# Patient Record
Sex: MALE
[De-identification: names, ages, dates, MRNs, and addresses within clinical notes are randomized per-mention and may not be internally consistent; named-entity substitution may affect disease eponyms.]

---

## 2017-11-22 ENCOUNTER — HOSPITAL ENCOUNTER (EMERGENCY)
Dept: HOSPITAL 31 - C.ER | Age: 57
Discharge: HOME | End: 2017-11-22
Payer: SELF-PAY

## 2017-11-22 VITALS — DIASTOLIC BLOOD PRESSURE: 82 MMHG | TEMPERATURE: 98.3 F | SYSTOLIC BLOOD PRESSURE: 170 MMHG | HEART RATE: 80 BPM

## 2017-11-22 VITALS — RESPIRATION RATE: 18 BRPM | OXYGEN SATURATION: 99 %

## 2017-11-22 DIAGNOSIS — R42: Primary | ICD-10-CM

## 2017-11-22 LAB
COHGB MFR BLD: 1.6 % (ref 0.5–1.5)
METHGB MFR BLD: 0.8 % (ref 0–3)
SAO2 % BLDA: 96.7 % (ref 95–98)

## 2017-11-22 NOTE — C.PDOC
History Of Present Illness


56 y/o male presents to ED with c/o dizziness, nausea. Patient reports he works 

as a  and is concerned for possible carbon monoxide exposure. Notes 

he had been sleeping in the truck. Denies any other symptoms. 





Time Seen by Provider: 17 09:18


Chief Complaint (Nursing): Dizziness/Lightheaded


History Per: Patient


History/Exam Limitations: no limitations


Current Symptoms Are (Timing): Still Present


Seizure Or Post-ictal Symptoms: None


Fall Associated With With Symptoms: No


Recent travel outside of the [place]: No





Past Medical History


Reviewed: Historical Data, Nursing Documentation, Vital Signs


Vital Signs: 


 Last Vital Signs











Temp  97.6 F   17 09:16


 


Pulse  76   17 09:16


 


Resp  18   17 09:16


 


BP  176/85 H  17 09:16


 


Pulse Ox  99   17 09:46














- Medical History


PMH: No Chronic Diseases


Family History: States: Unknown Family Hx





- Social History


Hx Alcohol Use: No


Hx Substance Use: No





- Immunization History


Hx Tetanus Toxoid Vaccination: No


Hx Influenza Vaccination: No


Hx Pneumococcal Vaccination: No





Review Of Systems


Except As Marked, All Systems Reviewed And Found Negative.


Constitutional: Negative for: Fever, Chills


Cardiovascular: Negative for: Chest Pain


Respiratory: Negative for: Cough, Shortness of Breath


Gastrointestinal: Positive for: Nausea.  Negative for: Vomiting


Skin: Negative for: Rash


Neurological: Positive for: Dizziness





Physical Exam





- Physical Exam


Appears: Non-toxic, No Acute Distress


Skin: Normal Color, Warm, Dry


Head: Atraumatic, Normacephalic


Oral Mucosa: Moist


Chest: Symmetrical


Cardiovascular: Rhythm Regular


Respiratory: Normal Breath Sounds, No Rales, No Rhonchi, No Wheezing


Gastrointestinal/Abdominal: Soft, No Tenderness


Back: Normal Inspection


Extremity: Normal ROM


Neurological/Psych: Oriented x3





ED Course And Treatment


ECG: Interpreted By Me


ECG Rhythm: Sinus Rhythm


ECG Interpretation: Normal


Rate From EC


O2 Sat by Pulse Oximetry: 99 (RA)


Pulse Ox Interpretation: Normal


Progress Note: Zofran ordered. Carboxyhemoglobin and EKG ordered/reviewed.  On 

re-evaluation lungs clear in no distress


Reassessment Condition: Improved





Disposition


Counseled Patient/Family Regarding: Studies Performed, Diagnosis, Need For 

Followup





- Disposition


Referrals: 


[email or web address] [Outside]


[facility] [Outside]


Disposition: HOME/ ROUTINE


Disposition Time: 11:20


Condition: STABLE


Additional Instructions: 


Follow up with PMD for further evaluation


Instructions:  How Your Lungs Work (ED), Lightheadedness (ED), Dizziness (ED)


Forms:  [facility] (English)





- POA


Present On Arrival: None





- Clinical Impression


Clinical Impression: 


 Dizziness








- PA / NP / Resident Statement


MD/DO has reviewed & agrees with the documentation as recorded.





- Scribe Statement


The provider has reviewed the documentation as recorded by the Scribe





SM





All medical record entries made by the Scribe were at my direction and 

personally dictated by me. I have reviewed the chart and agree that the record 

accurately reflects my personal performance of the history, physical exam, 

medical decision making, and the department course for this patient. I have 

also personally directed, reviewed, and agree with the discharge instructions 

and disposition.

## 2018-07-07 ENCOUNTER — HOSPITAL ENCOUNTER (EMERGENCY)
Dept: HOSPITAL 31 - C.ER | Age: 58
Discharge: HOME | End: 2018-07-07
Payer: SELF-PAY

## 2018-07-07 VITALS
HEART RATE: 55 BPM | DIASTOLIC BLOOD PRESSURE: 86 MMHG | SYSTOLIC BLOOD PRESSURE: 137 MMHG | TEMPERATURE: 98.7 F | RESPIRATION RATE: 18 BRPM

## 2018-07-07 VITALS — OXYGEN SATURATION: 100 %

## 2018-07-07 DIAGNOSIS — I10: Primary | ICD-10-CM

## 2018-07-07 DIAGNOSIS — R42: ICD-10-CM

## 2018-07-07 LAB
ALBUMIN SERPL-MCNC: 4.5 G/DL (ref 3.5–5)
ALBUMIN/GLOB SERPL: 1.5 {RATIO} (ref 1–2.1)
ALT SERPL-CCNC: 30 U/L (ref 21–72)
ANISOCYTOSIS BLD QL SMEAR: SLIGHT
AST SERPL-CCNC: 21 U/L (ref 17–59)
BASOPHILS # BLD AUTO: 0.1 K/UL (ref 0–0.2)
BASOPHILS NFR BLD: 0.4 % (ref 0–2)
BILIRUB UR-MCNC: NEGATIVE MG/DL
BUN SERPL-MCNC: 12 MG/DL (ref 9–20)
CALCIUM SERPL-MCNC: 9.2 MG/DL (ref 8.6–10.4)
EOSINOPHIL # BLD AUTO: 0 K/UL (ref 0–0.7)
EOSINOPHIL NFR BLD: 0.1 % (ref 0–4)
ERYTHROCYTE [DISTWIDTH] IN BLOOD BY AUTOMATED COUNT: 14 % (ref 11.5–14.5)
GFR NON-AFRICAN AMERICAN: > 60
GLUCOSE UR STRIP-MCNC: NORMAL MG/DL
HGB BLD-MCNC: 16.4 G/DL (ref 12–18)
LEUKOCYTE ESTERASE UR-ACNC: (no result) LEU/UL
LG PLATELETS BLD QL SMEAR: PRESENT
LYMPHOCYTE: 4 % (ref 20–40)
LYMPHOCYTES # BLD AUTO: 1.2 K/UL (ref 1–4.3)
LYMPHOCYTES NFR BLD AUTO: 9.1 % (ref 20–40)
MCH RBC QN AUTO: 29.8 PG (ref 27–31)
MCHC RBC AUTO-ENTMCNC: 34.3 G/DL (ref 33–37)
MCV RBC AUTO: 86.9 FL (ref 80–94)
MONOCYTE: 3 % (ref 0–10)
MONOCYTES # BLD: 0.5 K/UL (ref 0–0.8)
MONOCYTES NFR BLD: 3.6 % (ref 0–10)
NEUTROPHILS # BLD: 11.1 K/UL (ref 1.8–7)
NEUTROPHILS NFR BLD AUTO: 86.8 % (ref 50–75)
NEUTROPHILS NFR BLD AUTO: 93 % (ref 50–75)
NRBC BLD AUTO-RTO: 0.1 % (ref 0–2)
OVALOCYTES BLD QL SMEAR: SLIGHT
PH UR STRIP: 5 [PH] (ref 5–8)
PLATELET # BLD EST: NORMAL 10*3/UL
PLATELET # BLD: 170 K/UL (ref 130–400)
PMV BLD AUTO: 11.5 FL (ref 7.2–11.7)
POIKILOCYTOSIS BLD QL SMEAR: SLIGHT
PROT UR STRIP-MCNC: NEGATIVE MG/DL
RBC # BLD AUTO: 5.5 MIL/UL (ref 4.4–5.9)
RBC # UR STRIP: NEGATIVE /UL
SP GR UR STRIP: 1.01 (ref 1–1.03)
TOTAL CELLS COUNTED BLD: 100
UROBILINOGEN UR-MCNC: NORMAL MG/DL (ref 0.2–1)
WBC # BLD AUTO: 12.8 K/UL (ref 4.8–10.8)

## 2018-07-07 PROCEDURE — 81001 URINALYSIS AUTO W/SCOPE: CPT

## 2018-07-07 PROCEDURE — 70450 CT HEAD/BRAIN W/O DYE: CPT

## 2018-07-07 PROCEDURE — 96374 THER/PROPH/DIAG INJ IV PUSH: CPT

## 2018-07-07 PROCEDURE — 85025 COMPLETE CBC W/AUTO DIFF WBC: CPT

## 2018-07-07 PROCEDURE — 80053 COMPREHEN METABOLIC PANEL: CPT

## 2018-07-07 PROCEDURE — 99285 EMERGENCY DEPT VISIT HI MDM: CPT

## 2018-07-07 NOTE — CT
PROCEDURE:  CT HEAD WITHOUT CONTRAST.



HISTORY:

Headache, Dizzy



COMPARISON:

None available. 



TECHNIQUE:

Axial computed tomography images were obtained through the head/brain 

without intravenous contrast.  



Radiation dose:



Total exam DLP = 867.24 mGy-cm.



This CT exam was performed using one or more of the following dose 

reduction techniques: Automated exposure control, adjustment of the 

mA and/or kV according to patient size, and/or use of iterative 

reconstruction technique.



FINDINGS:



HEMORRHAGE:

No acute parenchymal, subarachnoid or extra-axial hemorrhage. 



BRAIN:

No evidence of large acute infarct. No obvious parenchymal nor 

extra-axial mass or collection seen on this noncontrast study 



VENTRICLES:

Unremarkable. No hydrocephalus. 



CALVARIUM:

Unremarkable.



PARANASAL SINUSES:

Unremarkable as visualized. No significant inflammatory changes.



MASTOID AIR CELLS:

Unremarkable as visualized. No inflammatory changes.



OTHER FINDINGS:

None.



IMPRESSION:

No acute intracranial hemorrhage

## 2018-07-07 NOTE — C.PDOC
History Of Present Illness


58 year old male with a history of hypertension and vertigo complains of 

dizziness and headache for 3 days. He reports he took his blood pressure at 

home which was over 140/90. He also states he went to his PMD yesterday and the 

dose for his blood pressure medication was increased, but he has not started 

his new medication yet. He states he took Meclizine for vertigo with no relief. 

Patient denies chest pain, shortness of breath, vision changes, numbness, 

weakness, or tingling. 


Time Seen by Provider: 18 15:05


Chief Complaint (Nursing): Dizziness/Lightheaded


History Per: Patient


History/Exam Limitations: no limitations


Onset/Duration Of Symptoms: Days


Current Symptoms Are (Timing): Still Present


Associated Symptoms Preceding Syncopal Episode: Vertigo





Past Medical History


Reviewed: Historical Data, Nursing Documentation, Vital Signs


Vital Signs: 


 Last Vital Signs











Temp  98.7 F   18 16:40


 


Pulse  55 L  18 16:40


 


Resp  18   18 16:40


 


BP  137/86   18 16:40


 


Pulse Ox  100   18 16:54














- Medical History


PMH: HTN


Surgical History: No Surg Hx


Family History: States: No Known Family Hx





- Social History


Hx Alcohol Use: No


Hx Substance Use: No





- Immunization History


Hx Tetanus Toxoid Vaccination: No


Hx Influenza Vaccination: No


Hx Pneumococcal Vaccination: No





Review Of Systems


Eyes: Negative for: Vision Change


Cardiovascular: Negative for: Chest Pain


Respiratory: Negative for: Shortness of Breath


Neurological: Positive for: Headache, Dizziness.  Negative for: Weakness, 

Numbness





Physical Exam





- Physical Exam


Appears: Non-toxic, No Acute Distress


Skin: Normal Color, Warm, Dry


Head: Atraumatic, Normacephalic


Eye(s): bilateral: Normal Inspection, PERRL, EOMI, Other (no nystagmus)


Oral Mucosa: Moist


Neck: Normal ROM


Chest: Symmetrical


Cardiovascular: Rhythm Regular


Respiratory: Normal Breath Sounds, No Rales, No Rhonchi, No Wheezing


Extremity: Bilateral: Atraumatic, Normal Color And Temperature, Normal ROM


Neurological/Psych: Oriented x3, Normal Speech, Normal Cranial Nerves, No 

Cerebellar Signs, Normal Motor, Normal Sensation


Gait: Steady





ED Course And Treatment





- Laboratory Results


Result Diagrams: 


 18 15:48





 18 15:48


ECG: Interpreted By Me, Viewed By Me


ECG Rhythm: Sinus Bradycardia


Interpretation Of ECG: Sinus bradycardia with 1st degree AV block, left axis 

deviation


Rate From EC


O2 Sat by Pulse Oximetry: 100 (RA)


Pulse Ox Interpretation: Normal





- CT Scan/US


  ** CT Head


Other Rad Studies (CT/US): Read By Radiologist, Radiology Report Reviewed


CT/US Interpretation: Accession No. : [id number].  Patient Name / ID : 

BOGDAN RUVALCABA  / [id number].  Exam Date : 2018 15:33:37 ( Approved ).  

Study Comment :  Sex / Age : M  / 58Y.  Creator : naseem salazar.  

Dictator :   :  Approver : Tolu Painter MD.  Approver2 :  

Report Date : 2018 15:35:31.  My Comment :  ******************************

*****************************************************.  PROCEDURE:  CT HEAD 

WITHOUT CONTRAST.  HISTORY:  Headache, Dizzy.  COMPARISON:  None available.  

TECHNIQUE:  Axial computed tomography images were obtained through the head/

brain without intravenous contrast.  Radiation dose:  Total exam DLP = 867.24 

mGy-cm.  This CT exam was performed using one or more of the following dose 

reduction techniques: Automated exposure control, adjustment of the mA and/or 

kV according to patient size, and/or use of iterative reconstruction technique.

  FINDINGS:  HEMORRHAGE:  No acute parenchymal, subarachnoid or extra-axial 

hemorrhage.  BRAIN:  No evidence of large acute infarct. No obvious parenchymal 

nor extra-axial mass or collection seen on this noncontrast study.  VENTRICLES:

  Unremarkable. No hydrocephalus.  CALVARIUM:  Unremarkable.  PARANASAL SINUSES

:  Unremarkable as visualized. No significant inflammatory changes.  MASTOID 

AIR CELLS:  Unremarkable as visualized. No inflammatory changes.  OTHER FINDINGS

:  None.  IMPRESSION:  No acute intracranial hemorrhage





Medical Decision Making


Medical Decision Making: 


Impression: Vertigo, HTN





Orders: 


-Labs


-EKG 


-CT head 


-Reglan 10mg IVP 





Labs reviewed. CT head shows no acute findings. 


On reassessment, patient is resting comfortably, and reports dizziness has 

resolved. The patient is ambulatory without any dizziness and vital signs 

improved and stable, no longer hypertensive without medication. Patient has no 

neurologic deficit, photophobia, rash, fever, or nuchal rigidity. I discussed 

the results with patient and plan for discharge. Patient agreeable with plan 

and all questions answered. Patient was instructed to follow up with physician. 

Patient advised to return to ER if symptoms persist or worsen.





Disposition


Counseled Patient/Family Regarding: Studies Performed, Diagnosis, Need For 

Followup





- Disposition


Referrals: 


Thanh Lira MD [Staff Provider] - 


Disposition: HOME/ ROUTINE


Disposition Time: 16:53


Condition: STABLE


Additional Instructions: 


Continue with normal medications for blood pressure and vertigo and can take 

50mg for dizziness





Instructions:  High Blood Pressure (DC), Vertigo (a Type of Dizziness) (DC)


Forms:  [facility] (English)





- POA


Present On Arrival: None





- Clinical Impression


Clinical Impression: 


 Dizziness, HTN (hypertension)








- PA / NP / Resident Statement


MD/DO has reviewed & agrees with the documentation as recorded.





- Scribe Statement


The provider has reviewed the documentation as recorded by the Scribe


Jessica Herndon








All medical record entries made by the Patrick were at my direction and 

personally dictated by me. I have reviewed the chart and agree that the record 

accurately reflects my personal performance of the history, physical exam, 

medical decision making, and the department course for this patient. I have 

also personally directed, reviewed, and agree with the discharge instructions 

and disposition.